# Patient Record
Sex: FEMALE | Race: WHITE
[De-identification: names, ages, dates, MRNs, and addresses within clinical notes are randomized per-mention and may not be internally consistent; named-entity substitution may affect disease eponyms.]

---

## 2020-03-27 ENCOUNTER — HOSPITAL ENCOUNTER (EMERGENCY)
Dept: HOSPITAL 96 - M.ERS | Age: 45
Discharge: ANOTHER HOSPITAL | End: 2020-03-27
Payer: COMMERCIAL

## 2020-03-27 VITALS — WEIGHT: 175 LBS | HEIGHT: 64 IN | BODY MASS INDEX: 29.88 KG/M2

## 2020-03-27 VITALS — DIASTOLIC BLOOD PRESSURE: 97 MMHG | SYSTOLIC BLOOD PRESSURE: 154 MMHG

## 2020-03-27 DIAGNOSIS — I10: ICD-10-CM

## 2020-03-27 DIAGNOSIS — I61.9: Primary | ICD-10-CM

## 2020-03-27 DIAGNOSIS — E03.9: ICD-10-CM

## 2020-03-27 DIAGNOSIS — Z88.0: ICD-10-CM

## 2020-03-27 DIAGNOSIS — I60.9: ICD-10-CM

## 2020-03-27 DIAGNOSIS — Z88.6: ICD-10-CM

## 2020-03-27 DIAGNOSIS — J45.909: ICD-10-CM

## 2020-03-27 DIAGNOSIS — Z90.49: ICD-10-CM

## 2020-03-27 DIAGNOSIS — Z88.8: ICD-10-CM

## 2020-03-27 DIAGNOSIS — G43.909: ICD-10-CM

## 2020-03-27 LAB
ABSOLUTE MONOCYTES: 0.7 THOU/UL (ref 0–1.2)
ALBUMIN SERPL-MCNC: 4.6 G/DL (ref 3.4–5)
ALP SERPL-CCNC: 83 U/L (ref 46–116)
ALT SERPL-CCNC: 22 U/L (ref 30–65)
ANION GAP SERPL CALC-SCNC: 19 MMOL/L (ref 7–16)
APAP SERPL-MCNC: < 2 UG/ML (ref 10–30)
APTT BLD: 23.5 SECONDS (ref 25–31.3)
AST SERPL-CCNC: 43 U/L (ref 15–37)
BE: -1.4 MMOL/L
BILIRUB SERPL-MCNC: 2.4 MG/DL
BUN SERPL-MCNC: 14 MG/DL (ref 7–18)
CALCIUM SERPL-MCNC: 10 MG/DL (ref 8.5–10.1)
CHLORIDE SERPL-SCNC: 94 MMOL/L (ref 98–107)
CO2 SERPL-SCNC: 22 MMOL/L (ref 21–32)
CREAT SERPL-MCNC: 1.3 MG/DL (ref 0.6–1.3)
ETHANOL SERPL-MCNC: < 10 MG/DL (ref ?–10)
FIBRINOGEN PPP-MCNC: 399 MG/DL (ref 200–340)
GLUCOSE SERPL-MCNC: 178 MG/DL (ref 70–99)
GRANULOCYTES NFR BLD MANUAL: 81 %
HCT VFR BLD CALC: 47.6 % (ref 37–47)
HGB BLD-MCNC: 17 GM/DL (ref 12–15)
INR PPP: 1.1
LYMPHOCYTES # BLD: 1.5 THOU/UL (ref 0.8–5.3)
LYMPHOCYTES NFR BLD AUTO: 7 %
MCH RBC QN AUTO: 29.5 PG (ref 26–34)
MCHC RBC AUTO-ENTMCNC: 35.6 G/DL (ref 28–37)
MCV RBC: 82.7 FL (ref 80–100)
MONOCYTES NFR BLD: 3 %
MPV: 8.5 FL. (ref 7.2–11.1)
NEUTROPHILS # BLD: 19.9 THOU/UL (ref 1.6–8.1)
NEUTS BAND NFR BLD: 9 %
NUCLEATED RBCS: 0 /100WBC
PCO2 BLD: < 17 MMHG (ref 35–45)
PLATELET # BLD EST: ADEQUATE 10*3/UL
PLATELET COUNT*: 286 THOU/UL (ref 150–400)
PO2 BLD: 149.7 MMHG (ref 75–100)
POTASSIUM SERPL-SCNC: 3.8 MMOL/L (ref 3.5–5.1)
PROT SERPL-MCNC: 9.7 G/DL (ref 6.4–8.2)
PROTHROMBIN TIME: 10.8 SECONDS (ref 9.2–11.5)
RBC # BLD AUTO: 5.76 MIL/UL (ref 4.2–5)
RBC MORPH BLD: NORMAL
RDW-CV: 14.3 % (ref 10.5–14.5)
SALICYLATES SERPL-MCNC: < 2.8 MG/DL (ref 2.8–20)
SODIUM SERPL-SCNC: 135 MMOL/L (ref 136–145)
WBC # BLD AUTO: 22.1 THOU/UL (ref 4–11)

## 2020-03-28 NOTE — EKG
[place] [address]
Phone:  [phone]                     ELECTROCARDIOGRAM REPORT      
_______________________________________________________________________________
 
Name:         CHIKIS ALAS                Room:                     [facility]#:    [id number]     Account #:     [id number]  
Admission:    20    Attend Phys:                     
Discharge:    20    Date of Birth: [birth year]  
Date of Service: 20  Report #:      [id number]
        [id number]
_______________________________________________________________________________
THIS REPORT FOR:  //name//                      
 
                         [facility] ED
                                       
Test Date:    2020               Test Time:    20:20:03
Pat Name:     CHIKIS ALAS             Department:   
Patient ID:   [id number]            Room:          
Gender:       F                        Technician:   
:          1975               Requested By: Abel Bee
Order Number: [id number] MD:   Michel Rivero
                                 Measurements
Intervals                              Axis          
Rate:         90                       P:            79
MS:           136                      QRS:          40
QRSD:         97                       T:            37
QT:           515                                    
QTc:          631                                    
                           Interpretive Statements
Sinus rhythm
Abnormal R-wave progression, early transition
Nonspecific T abnrm, anterolateral leads, consider ischemia
Prolonged QT interval
Compared to ECG 2016 11:05:31
Prolonged QT interval now present
st-t changes are noted
Electronically Signed On 3- 10:56:47 CDT by Michel Rivero
[email or web address]
 
 
 
 
 
 
 
 
 
 
 
 
 
 
 
 
 
  <ELECTRONICALLY SIGNED>
                                           By: Michel Rivero MD, [facility]     
  20     1056
D: 20   _____________________________________
T: 20   Michel Rivero MD, [facility]       /EPI

## 2020-12-09 ENCOUNTER — HOSPITAL ENCOUNTER (EMERGENCY)
Dept: HOSPITAL 96 - M.ERS | Age: 45
LOS: 1 days | Discharge: HOME | End: 2020-12-10
Payer: MEDICAID

## 2020-12-09 VITALS — BODY MASS INDEX: 26.66 KG/M2 | WEIGHT: 160.01 LBS | HEIGHT: 65 IN

## 2020-12-09 DIAGNOSIS — G43.909: ICD-10-CM

## 2020-12-09 DIAGNOSIS — R11.10: Primary | ICD-10-CM

## 2020-12-09 DIAGNOSIS — Z88.0: ICD-10-CM

## 2020-12-09 DIAGNOSIS — Z86.73: ICD-10-CM

## 2020-12-09 DIAGNOSIS — J45.909: ICD-10-CM

## 2020-12-09 DIAGNOSIS — E03.9: ICD-10-CM

## 2020-12-09 DIAGNOSIS — Z79.899: ICD-10-CM

## 2020-12-09 DIAGNOSIS — Z88.6: ICD-10-CM

## 2020-12-09 DIAGNOSIS — R20.2: ICD-10-CM

## 2020-12-09 LAB
ABSOLUTE BASOPHILS: 0 THOU/UL (ref 0–0.2)
ABSOLUTE EOSINOPHILS: 0.4 THOU/UL (ref 0–0.7)
ABSOLUTE MONOCYTES: 0.5 THOU/UL (ref 0–1.2)
ALBUMIN SERPL-MCNC: 3.7 G/DL (ref 3.4–5)
ALP SERPL-CCNC: 118 U/L (ref 46–116)
ALT SERPL-CCNC: 24 U/L (ref 30–65)
ANION GAP SERPL CALC-SCNC: 4 MMOL/L (ref 7–16)
AST SERPL-CCNC: 18 U/L (ref 15–37)
BACTERIA-REFLEX: (no result) /HPF
BASOPHILS NFR BLD AUTO: 0.7 %
BILIRUB SERPL-MCNC: 0.6 MG/DL
BILIRUB UR-MCNC: NEGATIVE MG/DL
BUN SERPL-MCNC: 13 MG/DL (ref 7–18)
CALCIUM SERPL-MCNC: 8.6 MG/DL (ref 8.5–10.1)
CHLORIDE SERPL-SCNC: 105 MMOL/L (ref 98–107)
CO2 SERPL-SCNC: 32 MMOL/L (ref 21–32)
COLOR UR: YELLOW
CREAT SERPL-MCNC: 1 MG/DL (ref 0.6–1.3)
EOSINOPHIL NFR BLD: 6.9 %
GLUCOSE SERPL-MCNC: 93 MG/DL (ref 70–99)
GRANULOCYTES NFR BLD MANUAL: 43.3 %
HCT VFR BLD CALC: 38.9 % (ref 37–47)
HGB BLD-MCNC: 13.3 GM/DL (ref 12–15)
INR PPP: < 0.9
KETONES UR STRIP-MCNC: (no result) MG/DL
LYMPHOCYTES # BLD: 2.5 THOU/UL (ref 0.8–5.3)
LYMPHOCYTES NFR BLD AUTO: 40.9 %
MAGNESIUM SERPL-MCNC: 2.4 MG/DL (ref 1.8–2.4)
MCH RBC QN AUTO: 28.8 PG (ref 26–34)
MCHC RBC AUTO-ENTMCNC: 34.2 G/DL (ref 28–37)
MCV RBC: 84 FL (ref 80–100)
MONOCYTES NFR BLD: 8.2 %
MPV: 7.3 FL. (ref 7.2–11.1)
MUCUS: (no result) STRN/LPF
NEUTROPHILS # BLD: 2.6 THOU/UL (ref 1.6–8.1)
NT-PRO BRAIN NAT PEPTIDE: 41 PG/ML (ref ?–300)
NUCLEATED RBCS: 0 /100WBC
PLATELET COUNT*: 207 THOU/UL (ref 150–400)
POTASSIUM SERPL-SCNC: 3.3 MMOL/L (ref 3.5–5.1)
PROT SERPL-MCNC: 7.7 G/DL (ref 6.4–8.2)
PROT UR QL STRIP: NEGATIVE
PROTHROMBIN TIME: 9.9 SECONDS (ref 9.2–11.5)
RBC # BLD AUTO: 4.63 MIL/UL (ref 4.2–5)
RBC # UR STRIP: NEGATIVE /UL
RDW-CV: 13.7 % (ref 10.5–14.5)
SODIUM SERPL-SCNC: 141 MMOL/L (ref 136–145)
SP GR UR STRIP: 1.02 (ref 1–1.03)
SQUAMOUS: (no result) /LPF (ref 0–3)
URINE CLARITY: (no result)
URINE GLUCOSE-RANDOM: NEGATIVE
URINE LEUKOCYTES-REFLEX: NEGATIVE
URINE NITRITE-REFLEX: NEGATIVE
UROBILINOGEN UR STRIP-ACNC: 0.2 E.U./DL (ref 0.2–1)
WBC # BLD AUTO: 6.1 THOU/UL (ref 4–11)

## 2020-12-10 VITALS — DIASTOLIC BLOOD PRESSURE: 69 MMHG | SYSTOLIC BLOOD PRESSURE: 109 MMHG

## 2020-12-10 NOTE — EKG
[place] [address]
Phone:  [phone]                     ELECTROCARDIOGRAM REPORT      
_______________________________________________________________________________
 
Name:         CHIKIS ALAS                Room:                     [facility]#:    [id number]     Account #:     [id number]  
Admission:    20    Attend Phys:                     
Discharge:    12/10/20    Date of Birth: [birth year]  
Date of Service: 20  Report #:      [id number]
        [id number]
_______________________________________________________________________________
THIS REPORT FOR:  //name//                      
 
                         [facility] ED
                                       
Test Date:    2020               Test Time:    20:51:53
Pat Name:     CHIKIS ALAS             Department:   
Patient ID:   [id number]            Room:          
Gender:       F                        Technician:   STUART
:          1975               Requested By: Lorena Escobar
Order Number: [id number] MD:   Genaro Stallworth
                                 Measurements
Intervals                              Axis          
Rate:         104                      P:            73
MT:           136                      QRS:          64
QRSD:         85                       T:            66
QT:           369                                    
QTc:          486                                    
                           Interpretive Statements
Sinus tachycardia
RSR' in V1 or V2, probably normal variant
Borderline prolonged QT interval
Compared to ECG 2020 20:20:03
RSR' in V1 or V2 now present
Sinus rhythm no longer present
Possible ischemia no longer present
Electronically Signed On 12- 10:05:37 CST by Genaro Stallworth
[email or web address]
 
 
 
 
 
 
 
 
 
 
 
 
 
 
 
 
 
  <ELECTRONICALLY SIGNED>
                                           By: Genaro Stallworth MD, FAC      
  12/10/20     1005
D: 20   _____________________________________
T: 20   Genaro Stallworth MD, FAC        /EPI

## 2021-01-05 ENCOUNTER — HOSPITAL ENCOUNTER (OUTPATIENT)
Dept: HOSPITAL 96 - M.ULTRA | Age: 46
End: 2021-01-05
Attending: NURSE PRACTITIONER
Payer: MEDICAID

## 2021-01-05 DIAGNOSIS — I10: ICD-10-CM

## 2021-01-05 DIAGNOSIS — N28.1: Primary | ICD-10-CM

## 2021-06-21 ENCOUNTER — HOSPITAL ENCOUNTER (EMERGENCY)
Dept: HOSPITAL 96 - M.ERS | Age: 46
Discharge: HOME | End: 2021-06-21
Payer: MEDICAID

## 2021-06-21 VITALS — DIASTOLIC BLOOD PRESSURE: 82 MMHG | SYSTOLIC BLOOD PRESSURE: 118 MMHG

## 2021-06-21 VITALS — BODY MASS INDEX: 27.16 KG/M2 | HEIGHT: 65 IN | WEIGHT: 163.01 LBS

## 2021-06-21 DIAGNOSIS — Z88.0: ICD-10-CM

## 2021-06-21 DIAGNOSIS — J45.909: ICD-10-CM

## 2021-06-21 DIAGNOSIS — G43.909: ICD-10-CM

## 2021-06-21 DIAGNOSIS — Z86.73: ICD-10-CM

## 2021-06-21 DIAGNOSIS — R20.2: Primary | ICD-10-CM

## 2021-06-21 DIAGNOSIS — E03.9: ICD-10-CM

## 2021-06-21 DIAGNOSIS — Z90.49: ICD-10-CM

## 2021-06-21 DIAGNOSIS — Z88.6: ICD-10-CM

## 2021-06-21 LAB
ABSOLUTE BASOPHILS: 0 THOU/UL (ref 0–0.2)
ABSOLUTE EOSINOPHILS: 0.4 THOU/UL (ref 0–0.7)
ABSOLUTE MONOCYTES: 0.4 THOU/UL (ref 0–1.2)
ALBUMIN SERPL-MCNC: 3.8 G/DL (ref 3.4–5)
ALP SERPL-CCNC: 95 U/L (ref 46–116)
ALT SERPL-CCNC: 24 U/L (ref 30–65)
ANION GAP SERPL CALC-SCNC: 9 MMOL/L (ref 7–16)
AST SERPL-CCNC: 12 U/L (ref 15–37)
BASOPHILS NFR BLD AUTO: 0.3 %
BILIRUB SERPL-MCNC: 0.7 MG/DL
BUN SERPL-MCNC: 18 MG/DL (ref 7–18)
CALCIUM SERPL-MCNC: 8.5 MG/DL (ref 8.5–10.1)
CHLORIDE SERPL-SCNC: 104 MMOL/L (ref 98–107)
CO2 SERPL-SCNC: 26 MMOL/L (ref 21–32)
CREAT SERPL-MCNC: 0.9 MG/DL (ref 0.6–1.3)
EOSINOPHIL NFR BLD: 7 %
GLUCOSE SERPL-MCNC: 99 MG/DL (ref 70–99)
GRANULOCYTES NFR BLD MANUAL: 53.2 %
HCT VFR BLD CALC: 39.3 % (ref 37–47)
HGB BLD-MCNC: 13.6 GM/DL (ref 12–15)
LYMPHOCYTES # BLD: 2 THOU/UL (ref 0.8–5.3)
LYMPHOCYTES NFR BLD AUTO: 32.9 %
MCH RBC QN AUTO: 30.2 PG (ref 26–34)
MCHC RBC AUTO-ENTMCNC: 34.8 G/DL (ref 28–37)
MCV RBC: 86.7 FL (ref 80–100)
MONOCYTES NFR BLD: 6.6 %
MPV: 6.9 FL. (ref 7.2–11.1)
NEUTROPHILS # BLD: 3.2 THOU/UL (ref 1.6–8.1)
NUCLEATED RBCS: 0 /100WBC
PLATELET COUNT*: 183 THOU/UL (ref 150–400)
POTASSIUM SERPL-SCNC: 3.7 MMOL/L (ref 3.5–5.1)
PROT SERPL-MCNC: 8 G/DL (ref 6.4–8.2)
RBC # BLD AUTO: 4.52 MIL/UL (ref 4.2–5)
RDW-CV: 13.5 % (ref 10.5–14.5)
SODIUM SERPL-SCNC: 139 MMOL/L (ref 136–145)
WBC # BLD AUTO: 6.1 THOU/UL (ref 4–11)